# Patient Record
Sex: MALE | Race: WHITE | NOT HISPANIC OR LATINO | Employment: OTHER | URBAN - METROPOLITAN AREA
[De-identification: names, ages, dates, MRNs, and addresses within clinical notes are randomized per-mention and may not be internally consistent; named-entity substitution may affect disease eponyms.]

---

## 2024-04-23 ENCOUNTER — HOSPITAL ENCOUNTER (INPATIENT)
Facility: HOSPITAL | Age: 44
LOS: 1 days | Discharge: HOME | DRG: 357 | End: 2024-04-24
Attending: EMERGENCY MEDICINE | Admitting: SURGERY
Payer: COMMERCIAL

## 2024-04-23 ENCOUNTER — ANESTHESIA EVENT (INPATIENT)
Dept: PERIOP | Facility: HOSPITAL | Age: 44
DRG: 357 | End: 2024-04-23
Payer: COMMERCIAL

## 2024-04-23 ENCOUNTER — ANESTHESIA (INPATIENT)
Dept: PERIOP | Facility: HOSPITAL | Age: 44
DRG: 357 | End: 2024-04-23
Payer: COMMERCIAL

## 2024-04-23 ENCOUNTER — APPOINTMENT (EMERGENCY)
Dept: CT IMAGING | Facility: HOSPITAL | Age: 44
DRG: 357 | End: 2024-04-23
Payer: COMMERCIAL

## 2024-04-23 DIAGNOSIS — Z98.890 S/P LAPAROSCOPY: ICD-10-CM

## 2024-04-23 DIAGNOSIS — K56.609 SBO (SMALL BOWEL OBSTRUCTION) (HCC): Primary | ICD-10-CM

## 2024-04-23 PROBLEM — F17.200 SMOKING: Status: ACTIVE | Noted: 2024-04-23

## 2024-04-23 PROBLEM — F41.9 ANXIETY: Status: ACTIVE | Noted: 2024-04-23

## 2024-04-23 PROBLEM — F19.10 DRUG ABUSE (HCC): Status: ACTIVE | Noted: 2024-04-23

## 2024-04-23 PROBLEM — F10.10 ALCOHOL ABUSE: Status: ACTIVE | Noted: 2024-04-23

## 2024-04-23 LAB
ALBUMIN SERPL BCP-MCNC: 4.2 G/DL (ref 3.5–5)
ALP SERPL-CCNC: 75 U/L (ref 34–104)
ALT SERPL W P-5'-P-CCNC: 15 U/L (ref 7–52)
ANION GAP SERPL CALCULATED.3IONS-SCNC: 6 MMOL/L (ref 4–13)
AST SERPL W P-5'-P-CCNC: 15 U/L (ref 13–39)
BASOPHILS # BLD AUTO: 0.05 THOUSANDS/ÂΜL (ref 0–0.1)
BASOPHILS NFR BLD AUTO: 0 % (ref 0–1)
BILIRUB SERPL-MCNC: 0.84 MG/DL (ref 0.2–1)
BUN SERPL-MCNC: 10 MG/DL (ref 5–25)
CALCIUM SERPL-MCNC: 9 MG/DL (ref 8.4–10.2)
CHLORIDE SERPL-SCNC: 103 MMOL/L (ref 96–108)
CO2 SERPL-SCNC: 26 MMOL/L (ref 21–32)
CREAT SERPL-MCNC: 0.8 MG/DL (ref 0.6–1.3)
EOSINOPHIL # BLD AUTO: 0.29 THOUSAND/ÂΜL (ref 0–0.61)
EOSINOPHIL NFR BLD AUTO: 2 % (ref 0–6)
ERYTHROCYTE [DISTWIDTH] IN BLOOD BY AUTOMATED COUNT: 12.4 % (ref 11.6–15.1)
GFR SERPL CREATININE-BSD FRML MDRD: 108 ML/MIN/1.73SQ M
GLUCOSE SERPL-MCNC: 132 MG/DL (ref 65–140)
HCT VFR BLD AUTO: 47.4 % (ref 36.5–49.3)
HGB BLD-MCNC: 15.6 G/DL (ref 12–17)
IMM GRANULOCYTES # BLD AUTO: 0.11 THOUSAND/UL (ref 0–0.2)
IMM GRANULOCYTES NFR BLD AUTO: 1 % (ref 0–2)
LIPASE SERPL-CCNC: 219 U/L (ref 11–82)
LYMPHOCYTES # BLD AUTO: 1.56 THOUSANDS/ÂΜL (ref 0.6–4.47)
LYMPHOCYTES NFR BLD AUTO: 10 % (ref 14–44)
MCH RBC QN AUTO: 29.5 PG (ref 26.8–34.3)
MCHC RBC AUTO-ENTMCNC: 32.9 G/DL (ref 31.4–37.4)
MCV RBC AUTO: 90 FL (ref 82–98)
MONOCYTES # BLD AUTO: 1.29 THOUSAND/ÂΜL (ref 0.17–1.22)
MONOCYTES NFR BLD AUTO: 8 % (ref 4–12)
NEUTROPHILS # BLD AUTO: 12.09 THOUSANDS/ÂΜL (ref 1.85–7.62)
NEUTS SEG NFR BLD AUTO: 79 % (ref 43–75)
NRBC BLD AUTO-RTO: 0 /100 WBCS
PLATELET # BLD AUTO: 285 THOUSANDS/UL (ref 149–390)
PMV BLD AUTO: 9.4 FL (ref 8.9–12.7)
POTASSIUM SERPL-SCNC: 4.4 MMOL/L (ref 3.5–5.3)
PROT SERPL-MCNC: 6.5 G/DL (ref 6.4–8.4)
RBC # BLD AUTO: 5.29 MILLION/UL (ref 3.88–5.62)
SODIUM SERPL-SCNC: 135 MMOL/L (ref 135–147)
WBC # BLD AUTO: 15.39 THOUSAND/UL (ref 4.31–10.16)

## 2024-04-23 PROCEDURE — 74177 CT ABD & PELVIS W/CONTRAST: CPT

## 2024-04-23 PROCEDURE — 96361 HYDRATE IV INFUSION ADD-ON: CPT

## 2024-04-23 PROCEDURE — 0WJG4ZZ INSPECTION OF PERITONEAL CAVITY, PERCUTANEOUS ENDOSCOPIC APPROACH: ICD-10-PCS | Performed by: SURGERY

## 2024-04-23 PROCEDURE — 36415 COLL VENOUS BLD VENIPUNCTURE: CPT

## 2024-04-23 PROCEDURE — 99223 1ST HOSP IP/OBS HIGH 75: CPT | Performed by: SURGERY

## 2024-04-23 PROCEDURE — 85025 COMPLETE CBC W/AUTO DIFF WBC: CPT

## 2024-04-23 PROCEDURE — 96375 TX/PRO/DX INJ NEW DRUG ADDON: CPT

## 2024-04-23 PROCEDURE — 80053 COMPREHEN METABOLIC PANEL: CPT

## 2024-04-23 PROCEDURE — 96374 THER/PROPH/DIAG INJ IV PUSH: CPT

## 2024-04-23 PROCEDURE — C9113 INJ PANTOPRAZOLE SODIUM, VIA: HCPCS

## 2024-04-23 PROCEDURE — 99285 EMERGENCY DEPT VISIT HI MDM: CPT

## 2024-04-23 PROCEDURE — 99284 EMERGENCY DEPT VISIT MOD MDM: CPT

## 2024-04-23 PROCEDURE — 49320 DIAG LAPARO SEPARATE PROC: CPT | Performed by: SURGERY

## 2024-04-23 PROCEDURE — 83690 ASSAY OF LIPASE: CPT

## 2024-04-23 PROCEDURE — 49320 DIAG LAPARO SEPARATE PROC: CPT

## 2024-04-23 RX ORDER — SUCCINYLCHOLINE/SOD CL,ISO/PF 100 MG/5ML
SYRINGE (ML) INTRAVENOUS AS NEEDED
Status: DISCONTINUED | OUTPATIENT
Start: 2024-04-23 | End: 2024-04-23

## 2024-04-23 RX ORDER — ONDANSETRON 2 MG/ML
4 INJECTION INTRAMUSCULAR; INTRAVENOUS ONCE
Status: COMPLETED | OUTPATIENT
Start: 2024-04-23 | End: 2024-04-23

## 2024-04-23 RX ORDER — HYDROMORPHONE HCL/PF 1 MG/ML
SYRINGE (ML) INJECTION AS NEEDED
Status: DISCONTINUED | OUTPATIENT
Start: 2024-04-23 | End: 2024-04-23

## 2024-04-23 RX ORDER — HEPARIN SODIUM 5000 [USP'U]/ML
5000 INJECTION, SOLUTION INTRAVENOUS; SUBCUTANEOUS EVERY 8 HOURS SCHEDULED
Status: DISCONTINUED | OUTPATIENT
Start: 2024-04-24 | End: 2024-04-24 | Stop reason: HOSPADM

## 2024-04-23 RX ORDER — MIDAZOLAM HYDROCHLORIDE 2 MG/2ML
1 INJECTION, SOLUTION INTRAMUSCULAR; INTRAVENOUS ONCE
Status: COMPLETED | OUTPATIENT
Start: 2024-04-23 | End: 2024-04-23

## 2024-04-23 RX ORDER — ONDANSETRON 2 MG/ML
INJECTION INTRAMUSCULAR; INTRAVENOUS AS NEEDED
Status: DISCONTINUED | OUTPATIENT
Start: 2024-04-23 | End: 2024-04-23

## 2024-04-23 RX ORDER — MIDAZOLAM HYDROCHLORIDE 2 MG/2ML
INJECTION, SOLUTION INTRAMUSCULAR; INTRAVENOUS AS NEEDED
Status: DISCONTINUED | OUTPATIENT
Start: 2024-04-23 | End: 2024-04-23

## 2024-04-23 RX ORDER — SODIUM CHLORIDE, SODIUM LACTATE, POTASSIUM CHLORIDE, CALCIUM CHLORIDE 600; 310; 30; 20 MG/100ML; MG/100ML; MG/100ML; MG/100ML
125 INJECTION, SOLUTION INTRAVENOUS CONTINUOUS
Status: DISCONTINUED | OUTPATIENT
Start: 2024-04-23 | End: 2024-04-24

## 2024-04-23 RX ORDER — KETOROLAC TROMETHAMINE 30 MG/ML
INJECTION, SOLUTION INTRAMUSCULAR; INTRAVENOUS AS NEEDED
Status: DISCONTINUED | OUTPATIENT
Start: 2024-04-23 | End: 2024-04-23

## 2024-04-23 RX ORDER — HYDROMORPHONE HCL/PF 1 MG/ML
0.5 SYRINGE (ML) INJECTION EVERY 6 HOURS PRN
Status: DISCONTINUED | OUTPATIENT
Start: 2024-04-23 | End: 2024-04-24

## 2024-04-23 RX ORDER — PROPOFOL 10 MG/ML
INJECTION, EMULSION INTRAVENOUS AS NEEDED
Status: DISCONTINUED | OUTPATIENT
Start: 2024-04-23 | End: 2024-04-23

## 2024-04-23 RX ORDER — LIDOCAINE HYDROCHLORIDE 20 MG/ML
1 JELLY TOPICAL ONCE
Status: COMPLETED | OUTPATIENT
Start: 2024-04-23 | End: 2024-04-23

## 2024-04-23 RX ORDER — LIDOCAINE HCL/PF 100 MG/5ML
SYRINGE (ML) INJECTION AS NEEDED
Status: DISCONTINUED | OUTPATIENT
Start: 2024-04-23 | End: 2024-04-23

## 2024-04-23 RX ORDER — ENOXAPARIN SODIUM 100 MG/ML
40 INJECTION SUBCUTANEOUS DAILY
Status: DISCONTINUED | OUTPATIENT
Start: 2024-04-24 | End: 2024-04-23

## 2024-04-23 RX ORDER — CEFAZOLIN SODIUM 2 G/50ML
2000 SOLUTION INTRAVENOUS ONCE
Status: COMPLETED | OUTPATIENT
Start: 2024-04-23 | End: 2024-04-23

## 2024-04-23 RX ORDER — BUPIVACAINE HYDROCHLORIDE AND EPINEPHRINE 2.5; 5 MG/ML; UG/ML
INJECTION, SOLUTION EPIDURAL; INFILTRATION; INTRACAUDAL; PERINEURAL AS NEEDED
Status: DISCONTINUED | OUTPATIENT
Start: 2024-04-23 | End: 2024-04-23 | Stop reason: HOSPADM

## 2024-04-23 RX ORDER — CEFAZOLIN SODIUM 2 G/50ML
SOLUTION INTRAVENOUS AS NEEDED
Status: DISCONTINUED | OUTPATIENT
Start: 2024-04-23 | End: 2024-04-23

## 2024-04-23 RX ORDER — PANTOPRAZOLE SODIUM 40 MG/10ML
40 INJECTION, POWDER, LYOPHILIZED, FOR SOLUTION INTRAVENOUS EVERY 12 HOURS SCHEDULED
Status: DISCONTINUED | OUTPATIENT
Start: 2024-04-23 | End: 2024-04-24 | Stop reason: HOSPADM

## 2024-04-23 RX ORDER — ROCURONIUM BROMIDE 10 MG/ML
INJECTION, SOLUTION INTRAVENOUS AS NEEDED
Status: DISCONTINUED | OUTPATIENT
Start: 2024-04-23 | End: 2024-04-23

## 2024-04-23 RX ORDER — LANOLIN ALCOHOL/MO/W.PET/CERES
3 CREAM (GRAM) TOPICAL
Status: DISCONTINUED | OUTPATIENT
Start: 2024-04-23 | End: 2024-04-24 | Stop reason: HOSPADM

## 2024-04-23 RX ORDER — FENTANYL CITRATE 50 UG/ML
INJECTION, SOLUTION INTRAMUSCULAR; INTRAVENOUS AS NEEDED
Status: DISCONTINUED | OUTPATIENT
Start: 2024-04-23 | End: 2024-04-23

## 2024-04-23 RX ORDER — DEXAMETHASONE SODIUM PHOSPHATE 10 MG/ML
INJECTION, SOLUTION INTRAMUSCULAR; INTRAVENOUS AS NEEDED
Status: DISCONTINUED | OUTPATIENT
Start: 2024-04-23 | End: 2024-04-23

## 2024-04-23 RX ORDER — HYDROMORPHONE HCL/PF 1 MG/ML
0.5 SYRINGE (ML) INJECTION EVERY 6 HOURS PRN
Status: CANCELLED | OUTPATIENT
Start: 2024-04-23

## 2024-04-23 RX ORDER — HEPARIN SODIUM 5000 [USP'U]/ML
5000 INJECTION, SOLUTION INTRAVENOUS; SUBCUTANEOUS EVERY 8 HOURS SCHEDULED
Status: CANCELLED | OUTPATIENT
Start: 2024-04-23

## 2024-04-23 RX ORDER — METRONIDAZOLE 500 MG/100ML
500 INJECTION, SOLUTION INTRAVENOUS ONCE
Status: COMPLETED | OUTPATIENT
Start: 2024-04-23 | End: 2024-04-23

## 2024-04-23 RX ORDER — ACETAMINOPHEN 10 MG/ML
1000 INJECTION, SOLUTION INTRAVENOUS EVERY 6 HOURS PRN
Status: DISCONTINUED | OUTPATIENT
Start: 2024-04-23 | End: 2024-04-23

## 2024-04-23 RX ORDER — KETOROLAC TROMETHAMINE 30 MG/ML
15 INJECTION, SOLUTION INTRAMUSCULAR; INTRAVENOUS ONCE
Status: COMPLETED | OUTPATIENT
Start: 2024-04-23 | End: 2024-04-23

## 2024-04-23 RX ORDER — SODIUM CHLORIDE 9 MG/ML
125 INJECTION, SOLUTION INTRAVENOUS CONTINUOUS
Status: CANCELLED | OUTPATIENT
Start: 2024-04-23

## 2024-04-23 RX ADMIN — SUGAMMADEX 200 MG: 100 INJECTION, SOLUTION INTRAVENOUS at 20:51

## 2024-04-23 RX ADMIN — Medication 3 MG: at 22:08

## 2024-04-23 RX ADMIN — IOHEXOL 100 ML: 350 INJECTION, SOLUTION INTRAVENOUS at 14:51

## 2024-04-23 RX ADMIN — LIDOCAINE HYDROCHLORIDE 1 APPLICATION: 20 JELLY TOPICAL at 18:07

## 2024-04-23 RX ADMIN — MIDAZOLAM 1 MG: 1 INJECTION INTRAMUSCULAR; INTRAVENOUS at 18:07

## 2024-04-23 RX ADMIN — PROPOFOL 300 MG: 10 INJECTION, EMULSION INTRAVENOUS at 19:51

## 2024-04-23 RX ADMIN — SODIUM CHLORIDE 1000 ML: 0.9 INJECTION, SOLUTION INTRAVENOUS at 13:59

## 2024-04-23 RX ADMIN — CEFAZOLIN SODIUM 2000 MG: 2 SOLUTION INTRAVENOUS at 18:48

## 2024-04-23 RX ADMIN — ONDANSETRON 4 MG: 2 INJECTION INTRAMUSCULAR; INTRAVENOUS at 13:58

## 2024-04-23 RX ADMIN — KETOROLAC TROMETHAMINE 30 MG: 30 INJECTION, SOLUTION INTRAMUSCULAR; INTRAVENOUS at 20:43

## 2024-04-23 RX ADMIN — ONDANSETRON 4 MG: 2 INJECTION INTRAMUSCULAR; INTRAVENOUS at 20:42

## 2024-04-23 RX ADMIN — DEXMEDETOMIDINE 8 MCG: 100 INJECTION, SOLUTION, CONCENTRATE INTRAVENOUS at 19:40

## 2024-04-23 RX ADMIN — HYDROMORPHONE HYDROCHLORIDE 1 MG: 1 INJECTION, SOLUTION INTRAMUSCULAR; INTRAVENOUS; SUBCUTANEOUS at 20:38

## 2024-04-23 RX ADMIN — PANTOPRAZOLE SODIUM 40 MG: 40 INJECTION, POWDER, FOR SOLUTION INTRAVENOUS at 22:08

## 2024-04-23 RX ADMIN — ROCURONIUM BROMIDE 50 MG: 10 INJECTION, SOLUTION INTRAVENOUS at 19:59

## 2024-04-23 RX ADMIN — MIDAZOLAM 2 MG: 1 INJECTION INTRAMUSCULAR; INTRAVENOUS at 19:40

## 2024-04-23 RX ADMIN — FENTANYL CITRATE 50 MCG: 50 INJECTION, SOLUTION INTRAMUSCULAR; INTRAVENOUS at 19:51

## 2024-04-23 RX ADMIN — DEXAMETHASONE SODIUM PHOSPHATE 10 MG: 10 INJECTION, SOLUTION INTRAMUSCULAR; INTRAVENOUS at 20:43

## 2024-04-23 RX ADMIN — Medication 100 MG: at 19:52

## 2024-04-23 RX ADMIN — SODIUM CHLORIDE, SODIUM LACTATE, POTASSIUM CHLORIDE, AND CALCIUM CHLORIDE: .6; .31; .03; .02 INJECTION, SOLUTION INTRAVENOUS at 20:45

## 2024-04-23 RX ADMIN — KETOROLAC TROMETHAMINE 15 MG: 30 INJECTION, SOLUTION INTRAMUSCULAR; INTRAVENOUS at 13:58

## 2024-04-23 RX ADMIN — CEFAZOLIN SODIUM 2000 MG: 2 SOLUTION INTRAVENOUS at 19:55

## 2024-04-23 RX ADMIN — METRONIDAZOLE: 5 INJECTION, SOLUTION INTRAVENOUS at 19:58

## 2024-04-23 RX ADMIN — TOPICAL ANESTHETIC 2 SPRAY: 200 SPRAY DENTAL; PERIODONTAL at 18:07

## 2024-04-23 RX ADMIN — DEXMEDETOMIDINE 8 MCG: 100 INJECTION, SOLUTION, CONCENTRATE INTRAVENOUS at 19:44

## 2024-04-23 RX ADMIN — SODIUM CHLORIDE, SODIUM LACTATE, POTASSIUM CHLORIDE, AND CALCIUM CHLORIDE: .6; .31; .03; .02 INJECTION, SOLUTION INTRAVENOUS at 19:43

## 2024-04-23 RX ADMIN — FENTANYL CITRATE 50 MCG: 50 INJECTION, SOLUTION INTRAMUSCULAR; INTRAVENOUS at 20:04

## 2024-04-23 RX ADMIN — LIDOCAINE HYDROCHLORIDE 100 MG: 20 INJECTION INTRAVENOUS at 19:51

## 2024-04-23 RX ADMIN — DEXMEDETOMIDINE 4 MCG: 100 INJECTION, SOLUTION, CONCENTRATE INTRAVENOUS at 19:47

## 2024-04-23 NOTE — H&P
"H&P Exam - General Surgery   Cj De La Cruz 43 y.o. male MRN: [id number]  Unit/Bed#: ED 04 Encounter: [id number]    Assessment/Plan     Assessment:    Presents with abdominal pain x this morning. N/V + diarrhea x 2 days.    No significant past medical history, no surgical history.   Abdominal exam with distension, TTP epigastric region. Tympanic. No guarding, rebound, or peritoneal signs. Normal bowel sounds.   Afebrile, VSS   CT A/P   Multiple dilated proximal and mid small bowel loops measuring up to 4.4 cm in diameter with \"small bowel feces sign\", and collapsed distal ileal loops, suggestive of low-grade small bowel obstruction.   Exact transition point is not clearly identified but appears to be in the right lower quadrant. Moderate gastric distention.  WBC 15.39   Lipase 219     Plan:  Admit to surgical service, plan for diagnostic laparoscopy this evening given history of virgin abdomen  IVF, IV abx  NPO, NGT   Surgical consent obtained   Case request added   Procedure discussed in detail with patient and family, risk vs. benefits of procedure discussed.   Trend vitals, labs, abdominal exam   KUB for morning   Pain control  DVT, VTE prophylaxis     Incidental finding on CT   Indeterminate 10 mm right renal lesion, for which further characterization with nonemergent renal ultrasound is recommended.   Discussed with patient, recommended outpatient follow up with PCP for further w/u       History of Present Illness     HPI:  Cj De La Cruz is a 43 y.o. male with no significant past medical history, no significant surgical history presenting to ED with concerns of abdominal pain x this morning and N/V + diarrhea x 2 days. Patient states he ate left over sushi from Saturday on Sunday and began with N/V + diarrhea Sunday evening into Monday. Initially thought to be related to gastroenteritis from eating the left over sushi. Denies any complaints of N/V or diarrhea today. Denies any fevers, or chills. Presented with " "abdominal pain beginning this AM, describes pain and dull and achy sensation centered around his umbilicus and epigastric region. Patient admits he was able to eat some special K fiber cereal this AM which be thought to trigger his abdominal pain. Patient denies any recent travel history, sick contacts, or hx of viral illnesses. Patient further admits he has not been to doctor for the past 15 years. Admits to drinking \"here and there\", having approximately 6-7 alcoholic drinks per week. Per patient's wife family has PMHx of drug and alcohol abuse, previous use with cocaine and meth. Denies any headache, SOB, chest pain, back pain, or other changes in his bladder or bowel habits not previously mentioned above. Denies further questions or complaints.     Review of Systems   Constitutional:  Negative for chills and fever.   Respiratory:  Negative for cough.    Gastrointestinal:  Positive for abdominal distention and abdominal pain. Negative for nausea and vomiting.   Genitourinary:  Negative for difficulty urinating.   Musculoskeletal:  Negative for arthralgias.       Historical Information   Past Medical History:   Diagnosis Date    Alcohol abuse     Anxiety     Drug abuse (HCC)     pt reports abusing alcohol and occassionally meth and cocaine     History reviewed. No pertinent surgical history.  Social History   Social History     Substance and Sexual Activity   Alcohol Use Yes    Alcohol/week: 12.0 - 18.0 standard drinks of alcohol    Types: 12 - 18 Cans of beer per week    Comment: says he was sober for 6 months, then 4-6 weeks ago began drinking 12-18 beers once or twice on weekend nights     Social History     Substance and Sexual Activity   Drug Use Yes    Types: Cocaine, Methamphetamines    Comment: pt reports occasional use of meth and cocaine     Social History     Tobacco Use   Smoking Status Former   Smokeless Tobacco Not on file     E-Cigarette/Vaping     E-Cigarette/Vaping Substances     Family History: " non-contributory    Meds/Allergies   all medications and allergies reviewed  No Known Allergies    Objective   First Vitals:   Blood Pressure: 137/87 (04/23/24 1318)  Pulse: 57 (04/23/24 1318)  Temperature: 97.5 °F (36.4 °C) (04/23/24 1318)  Temp Source: Oral (04/23/24 1318)  Respirations: 18 (04/23/24 1318)  SpO2: 100 % (04/23/24 1318)    Current Vitals:   Blood Pressure: 138/78 (04/23/24 1600)  Pulse: (!) 50 (04/23/24 1600)  Temperature: 97.5 °F (36.4 °C) (04/23/24 1318)  Temp Source: Oral (04/23/24 1318)  Respirations: 13 (04/23/24 1510)  SpO2: 99 % (04/23/24 1600)    No intake or output data in the 24 hours ending 04/23/24 1620    Invasive Devices       Peripheral Intravenous Line  Duration             Peripheral IV 04/23/24 Right Antecubital <1 day                    Physical Exam  Vitals and nursing note reviewed.   HENT:      Head: Normocephalic.      Right Ear: External ear normal.      Left Ear: External ear normal.      Nose: Nose normal.   Eyes:      Pupils: Pupils are equal, round, and reactive to light.   Cardiovascular:      Rate and Rhythm: Normal rate and regular rhythm.      Pulses: Normal pulses.      Heart sounds: Normal heart sounds.   Pulmonary:      Effort: Pulmonary effort is normal. No respiratory distress.      Breath sounds: Normal breath sounds.   Abdominal:      General: Abdomen is flat. Bowel sounds are normal. There is distension.      Palpations: Abdomen is soft. There is no mass.      Tenderness: There is abdominal tenderness in the epigastric area and periumbilical area. There is no guarding or rebound. Negative signs include Mckeon's sign and McBurney's sign.      Hernia: No hernia is present.   Musculoskeletal:         General: Normal range of motion.   Skin:     General: Skin is warm.   Neurological:      Mental Status: He is alert. Mental status is at baseline.   Psychiatric:         Mood and Affect: Mood normal.         Behavior: Behavior normal.         Thought Content: Thought  content normal.         Judgment: Judgment normal.         Lab Results: I have personally reviewed pertinent lab results.  , CBC:   Lab Results   Component Value Date    WBC 15.39 (H) 04/23/2024    HGB 15.6 04/23/2024    HCT 47.4 04/23/2024    MCV 90 04/23/2024     04/23/2024    RBC 5.29 04/23/2024    MCH 29.5 04/23/2024    MCHC 32.9 04/23/2024    RDW 12.4 04/23/2024    MPV 9.4 04/23/2024    NRBC 0 04/23/2024   , CMP:   Lab Results   Component Value Date    SODIUM 135 04/23/2024    K 4.4 04/23/2024     04/23/2024    CO2 26 04/23/2024    BUN 10 04/23/2024    CREATININE 0.80 04/23/2024    CALCIUM 9.0 04/23/2024    AST 15 04/23/2024    ALT 15 04/23/2024    ALKPHOS 75 04/23/2024    EGFR 108 04/23/2024     Imaging: I have personally reviewed pertinent reports.    EKG, Pathology, and Other Studies: I have personally reviewed pertinent reports.      Code Status: No Order  Advance Directive and Living Will:      Power of :    POLST:      Counseling / Coordination of Care  Total floor / unit time spent today 40 minutes.  Greater than 50% of total time was spent with the patient and / or family counseling and / or coordination of care.  A description of the counseling / coordination of care: 40..

## 2024-04-23 NOTE — ANESTHESIA PREPROCEDURE EVALUATION
Procedure:  LAPAROSCOPY DIAGNOSTIC (Abdomen)    Relevant Problems   ANESTHESIA (within normal limits)  Only prior for wisdom teeth extraction; no issues      CARDIO (within normal limits)      GI/HEPATIC  SBO   NGT placed in ED  Nausea improved  Ate breakfast 0600, drank small amount around noon, few ounces at 1800 with NGT placement      NEURO/PSYCH   (+) Anxiety      PULMONARY   (+) Smoking (Occasional, + smokeless tobacco use)   (-) URI (upper respiratory infection)      Behavioral Health   (+) Alcohol abuse (H/o)   (+) Drug abuse (HCC) (H/o cocaine, meth use)      Physical Exam    Airway    Mallampati score: II  TM Distance: >3 FB  Neck ROM: full     Dental   No notable dental hx     Cardiovascular      Pulmonary      Other Findings      Lab Results   Component Value Date    WBC 15.39 (H) 04/23/2024    HGB 15.6 04/23/2024     04/23/2024     Lab Results   Component Value Date    SODIUM 135 04/23/2024    K 4.4 04/23/2024    BUN 10 04/23/2024    CREATININE 0.80 04/23/2024    EGFR 108 04/23/2024     CT A/P IMPRESSION:     1. Findings suggestive of small bowel obstruction with transition point probably in the right lower quadrant.     2. Mild mesenteric edema with trace interloop free fluid.     3. Incidentally noted indeterminate 10 mm right renal lesion, for which further characterization with nonemergent renal ultrasound is recommended.    Anesthesia Plan  ASA Score- 2 Emergent    Anesthesia Type- general with ASA Monitors.         Additional Monitors:     Airway Plan: ETT.           Plan Factors-Exercise tolerance (METS): >4 METS.    Chart reviewed.   Existing labs reviewed. Patient summary reviewed.    Patient is a current smoker.  Patient did not smoke on day of surgery.            Induction- intravenous and rapid sequence induction.    Postoperative Plan-     Informed Consent- Anesthetic plan and risks discussed with patient.  I personally reviewed this patient with the CRNA. Discussed and agreed on the  Anesthesia Plan with the CRNA..

## 2024-04-23 NOTE — ED PROVIDER NOTES
History  Chief Complaint   Patient presents with    Abdominal Pain     Patient presents to the ED with c/o vomiting and diarrhea with abdominal pain, states went to urgent care and had an xray and was sent for possible bowel blockage.      The patient is a 43-year-old male with PMH of alcohol abuse, occasional methamphetamine and cocaine use, and anxiety presenting for evaluation of 1 day of abdominal pain and N/V/D.  The patient started yesterday evening with periumbilical and generalized abdominal discomfort described as bloating and gassiness.  He notes soon after developing nausea, vomiting, diarrhea.  He vomited approximately 6-8 times which initially was undigested food and towards the end was more liquids and yellow in appearance.  He notes having looser than normal bowel movements (loose brown, no blood or melena).  He notes throughout the night having ongoing abdominal pain.  He went to urgent care prior to arrival and was instructed to come here for further evaluation and concern of SBO given abnormal abdominal x-ray.  He denies associated fevers but endorses chills.  He denies lightheadedness/dizziness, CP/SOB, flank pain, urinary complaints, and skin changes.  He notes drinking EtOH recreationally and none recently.  He does report eating sushi on Saturday and Sunday but no other uncooked fish or undercooked meats since that time.  He denies sick contacts.      History provided by:  Patient and significant other   used: No        Prior to Admission Medications   Prescriptions Last Dose Informant Patient Reported? Taking?   Calcium Carbonate Antacid (SHEYLA-SELTZER ANTACID PO)   Yes No   Sig: Take 1 tablet by mouth daily.      Facility-Administered Medications: None       Past Medical History:   Diagnosis Date    Alcohol abuse     Anxiety     Drug abuse (HCC)     pt reports abusing alcohol and occassionally meth and cocaine       History reviewed. No pertinent surgical  history.    History reviewed. No pertinent family history.  I have reviewed and agree with the history as documented.    E-Cigarette/Vaping     E-Cigarette/Vaping Substances     Social History     Tobacco Use    Smoking status: Former   Substance Use Topics    Alcohol use: Yes     Alcohol/week: 12.0 - 18.0 standard drinks of alcohol     Types: 12 - 18 Cans of beer per week     Comment: says he was sober for 6 months, then 4-6 weeks ago began drinking 12-18 beers once or twice on weekend nights    Drug use: Yes     Types: Cocaine, Methamphetamines     Comment: pt reports occasional use of meth and cocaine       Review of Systems   Constitutional:  Positive for chills and fatigue. Negative for fever.   Respiratory:  Negative for cough and shortness of breath.    Cardiovascular:  Negative for chest pain.   Gastrointestinal:  Positive for abdominal pain, diarrhea, nausea and vomiting.   Genitourinary: Negative.    Musculoskeletal:  Negative for back pain and gait problem.   Skin:  Negative for color change, pallor, rash and wound.   Neurological:  Negative for dizziness, syncope, weakness, light-headedness and headaches.   All other systems reviewed and are negative.      Physical Exam  Physical Exam  Vitals and nursing note reviewed.   Constitutional:       General: He is in acute distress (Evidencing mild discomfort and mild distress).      Appearance: Normal appearance. He is well-developed. He is ill-appearing (Appears fatigued and mildly ill). He is not toxic-appearing or diaphoretic.   HENT:      Head: Normocephalic and atraumatic.      Jaw: There is normal jaw occlusion.      Nose: Nose normal.      Mouth/Throat:      Lips: Pink.      Mouth: Mucous membranes are moist.   Eyes:      Extraocular Movements: Extraocular movements intact.      Conjunctiva/sclera: Conjunctivae normal.   Cardiovascular:      Rate and Rhythm: Normal rate and regular rhythm.      Pulses: Normal pulses.           Radial pulses are 2+ on  the right side and 2+ on the left side.        Dorsalis pedis pulses are 2+ on the right side and 2+ on the left side.      Heart sounds: Normal heart sounds, S1 normal and S2 normal. No murmur heard.  Pulmonary:      Effort: Pulmonary effort is normal. No tachypnea or respiratory distress.      Breath sounds: Normal breath sounds and air entry. No stridor, decreased air movement or transmitted upper airway sounds. No decreased breath sounds.   Abdominal:      Palpations: Abdomen is soft.      Tenderness: There is generalized abdominal tenderness and tenderness in the periumbilical area. There is no guarding or rebound. Negative signs include Mckeon's sign.   Musculoskeletal:         General: Normal range of motion.      Cervical back: Neck supple.      Right lower leg: No edema.      Left lower leg: No edema.   Skin:     General: Skin is warm and dry.      Capillary Refill: Capillary refill takes 2 to 3 seconds.      Findings: No rash or wound.   Neurological:      General: No focal deficit present.      Mental Status: He is alert and oriented to person, place, and time. Mental status is at baseline.         Vital Signs  ED Triage Vitals   Temperature Pulse Respirations Blood Pressure SpO2   04/23/24 1318 04/23/24 1318 04/23/24 1318 04/23/24 1318 04/23/24 1318   97.5 °F (36.4 °C) 57 18 137/87 100 %      Temp Source Heart Rate Source Patient Position - Orthostatic VS BP Location FiO2 (%)   04/23/24 1318 04/23/24 1318 04/23/24 1318 04/23/24 1318 --   Oral Monitor Lying Right arm       Pain Score       04/23/24 1315       5           Vitals:    04/23/24 1415 04/23/24 1510 04/23/24 1600 04/23/24 1800   BP: 136/87 135/75 138/78 137/80   Pulse: (!) 54 73 (!) 50 57   Patient Position - Orthostatic VS:             Visual Acuity      ED Medications  Medications   midazolam (VERSED) injection 1 mg (has no administration in time range)   lidocaine (URO-JET) 2 % urethral/mucosal gel 1 Application (has no administration in time  range)   benzocaine (HURRICAINE) 20 % mucosal spray 2 spray (has no administration in time range)   sodium chloride 0.9 % bolus 1,000 mL (1,000 mL Intravenous New Bag 4/23/24 1359)   ondansetron (ZOFRAN) injection 4 mg (4 mg Intravenous Given 4/23/24 1358)   ketorolac (TORADOL) injection 15 mg (15 mg Intravenous Given 4/23/24 1358)   iohexol (OMNIPAQUE) 350 MG/ML injection (SINGLE-DOSE) 100 mL (100 mL Intravenous Given 4/23/24 1451)       Diagnostic Studies  Results Reviewed       Procedure Component Value Units Date/Time    Comprehensive metabolic panel [[id number]] Collected: 04/23/24 1357    Lab Status: Final result Specimen: Blood from Arm, Right Updated: 04/23/24 1427     Sodium 135 mmol/L      Potassium 4.4 mmol/L      Chloride 103 mmol/L      CO2 26 mmol/L      ANION GAP 6 mmol/L      BUN 10 mg/dL      Creatinine 0.80 mg/dL      Glucose 132 mg/dL      Calcium 9.0 mg/dL      AST 15 U/L      ALT 15 U/L      Alkaline Phosphatase 75 U/L      Total Protein 6.5 g/dL      Albumin 4.2 g/dL      Total Bilirubin 0.84 mg/dL      eGFR 108 ml/min/1.73sq m     Narrative:      National Kid[employer] Foundation guidelines for Chronic Kidney Disease (CKD):     Stage 1 with normal or high GFR (GFR > 90 mL/min/1.73 square meters)    Stage 2 Mild CKD (GFR = 60-89 mL/min/1.73 square meters)    Stage 3A Moderate CKD (GFR = 45-59 mL/min/1.73 square meters)    Stage 3B Moderate CKD (GFR = 30-44 mL/min/1.73 square meters)    Stage 4 Severe CKD (GFR = 15-29 mL/min/1.73 square meters)    Stage 5 End Stage CKD (GFR <15 mL/min/1.73 square meters)  Note: GFR calculation is accurate only with a steady state creatinine    Lipase [[id number]]  (Abnormal) Collected: 04/23/24 1357    Lab Status: Final result Specimen: Blood from Arm, Right Updated: 04/23/24 1427     Lipase 219 u/L     CBC and differential [[id number]]  (Abnormal) Collected: 04/23/24 1357    Lab Status: Final result Specimen: Blood from Arm, Right Updated: 04/23/24 1409     WBC  15.39 Thousand/uL      RBC 5.29 Million/uL      Hemoglobin 15.6 g/dL      Hematocrit 47.4 %      MCV 90 fL      MCH 29.5 pg      MCHC 32.9 g/dL      RDW 12.4 %      MPV 9.4 fL      Platelets 285 Thousands/uL      nRBC 0 /100 WBCs      Segmented % 79 %      Immature Grans % 1 %      Lymphocytes % 10 %      Monocytes % 8 %      Eosinophils Relative 2 %      Basophils Relative 0 %      Absolute Neutrophils 12.09 Thousands/µL      Absolute Immature Grans 0.11 Thousand/uL      Absolute Lymphocytes 1.56 Thousands/µL      Absolute Monocytes 1.29 Thousand/µL      Eosinophils Absolute 0.29 Thousand/µL      Basophils Absolute 0.05 Thousands/µL                    CT abdomen pelvis with contrast   Final Result by Nereida Minor MD (04/23 1547)      1. Findings suggestive of small bowel obstruction with transition point probably in the right lower quadrant.      2. Mild mesenteric edema with trace interloop free fluid.      3. Incidentally noted indeterminate 10 mm right renal lesion, for which further characterization with nonemergent renal ultrasound is recommended.      The study was marked in EPIC for immediate notification.         Resident: SERAFIN Snow I, the attending radiologist, have reviewed the images and agree with the final report above.      Workstation performed: [id number]                    Procedures  Procedures         ED Course  ED Course as of 04/23/24 1807   Tue Apr 23, 2024   1430 LIPASE(!): 219  Elevated, obtaining CTAP   1603 CT abdomen pelvis with contrast  IMPRESSION:     1. Findings suggestive of small bowel obstruction with transition point probably in the right lower quadrant.     2. Mild mesenteric edema with trace interloop free fluid.     3. Incidentally noted indeterminate 10 mm right renal lesion, for which further characterization with nonemergent renal ultrasound is recommended.     1604 General surgery KIRSTIE Chandana messaged regarding patient case   1605 On reevaluation, patient  with improved appearance.  He notes his pain and nausea has significantly improved.  I updated him on blood work as well as CT results.  He is aware that I have reached out to general surgery for consult given finding of SBO   1806 Per Dr. Schwartz from surgery, plan for ex laparotomy to further evaluate for mass as cause of SBO.  They request NG tube insertion prior to being brought to the OR.  Will write for one-time dose of Versed as well as Uro-Jet and HurriCaine spray.  Patient in agreement with trying to place NG tube.  He is aware he is going to the OR and was consented by KIRSTIE Tomas at bedside                               SBIRT 20yo+      Flowsheet Row Most Recent Value   Initial Alcohol Screen: US AUDIT-C     1. How often do you have a drink containing alcohol? 0 Filed at: 04/23/2024 1316   2. How many drinks containing alcohol do you have on a typical day you are drinking?  0 Filed at: 04/23/2024 1316   3a. Male UNDER 65: How often do you have five or more drinks on one occasion? 0 Filed at: 04/23/2024 1316   3b. FEMALE Any Age, or MALE 65+: How often do you have 4 or more drinks on one occassion? 0 Filed at: 04/23/2024 1316   Audit-C Score 0 Filed at: 04/23/2024 1316   HESHAM: How many times in the past year have you...    Used an illegal drug or used a prescription medication for non-medical reasons? Never Filed at: 04/23/2024 1316                      Medical Decision Making  DDx including but not limited to: Viral illness, gastroenteritis, colitis, diverticulitis, partial SBO, SBO, pancreatitis, hepatitis    Labs and CT imaging obtained.  Blood work with leukocytosis with leftward shift.  Lipase is elevated to 219.  On further questioning, the patient denies history of acute pancreatitis.  CT imaging with dilated loops of bowel with finding of small bowel obstruction.  Discussed with general surgery who will come see the patient and likely admit.  Patient had improved pain and nausea with Zofran and  Toradol.  Fluids hung given he is n.p.o. while awaiting surgery evaluation.    General surgery to admit and likely take to OR.    Problems Addressed:  SBO (small bowel obstruction) (HCC): acute illness or injury    Amount and/or Complexity of Data Reviewed  Labs: ordered. Decision-making details documented in ED Course.  Radiology: ordered. Decision-making details documented in ED Course.    Risk  OTC drugs.  Prescription drug management.  Decision regarding hospitalization.             Disposition  Final diagnoses:   SBO (small bowel obstruction) (HCC)     Time reflects when diagnosis was documented in both MDM as applicable and the Disposition within this note       Time User Action Codes Description Comment    4/23/2024  5:43 PM Whit Tomas Add [K56.609] SBO (small bowel obstruction) (HCC)     4/23/2024  6:06 PM Amanda Diaz Modify [K56.609] SBO (small bowel obstruction) (HCC)           ED Disposition       ED Disposition   Admit    Condition   Stable    Date/Time   Tue Apr 23, 2024 [id number]    Comment   Case was discussed with KIRSTIE Tomas and the patient's admission status was agreed to be Admission Status: inpatient status to the service of Dr. Schwartz .               Follow-up Information    None         Patient's Medications   Discharge Prescriptions    No medications on file       No discharge procedures on file.    PDMP Review       None            ED Provider  Electronically Signed by             TEE Pittman  04/23/24 0875

## 2024-04-24 ENCOUNTER — APPOINTMENT (INPATIENT)
Dept: RADIOLOGY | Facility: HOSPITAL | Age: 44
DRG: 357 | End: 2024-04-24
Payer: COMMERCIAL

## 2024-04-24 VITALS
OXYGEN SATURATION: 100 % | DIASTOLIC BLOOD PRESSURE: 73 MMHG | WEIGHT: 210.1 LBS | HEIGHT: 73 IN | HEART RATE: 66 BPM | SYSTOLIC BLOOD PRESSURE: 122 MMHG | TEMPERATURE: 97.9 F | RESPIRATION RATE: 18 BRPM | BODY MASS INDEX: 27.85 KG/M2

## 2024-04-24 LAB
ANION GAP SERPL CALCULATED.3IONS-SCNC: 5 MMOL/L (ref 4–13)
BASOPHILS # BLD AUTO: 0.03 THOUSANDS/ÂΜL (ref 0–0.1)
BASOPHILS NFR BLD AUTO: 0 % (ref 0–1)
BUN SERPL-MCNC: 8 MG/DL (ref 5–25)
CALCIUM SERPL-MCNC: 8.6 MG/DL (ref 8.4–10.2)
CHLORIDE SERPL-SCNC: 107 MMOL/L (ref 96–108)
CO2 SERPL-SCNC: 25 MMOL/L (ref 21–32)
CREAT SERPL-MCNC: 0.82 MG/DL (ref 0.6–1.3)
EOSINOPHIL # BLD AUTO: 0 THOUSAND/ÂΜL (ref 0–0.61)
EOSINOPHIL NFR BLD AUTO: 0 % (ref 0–6)
ERYTHROCYTE [DISTWIDTH] IN BLOOD BY AUTOMATED COUNT: 12.7 % (ref 11.6–15.1)
GFR SERPL CREATININE-BSD FRML MDRD: 107 ML/MIN/1.73SQ M
GLUCOSE SERPL-MCNC: 157 MG/DL (ref 65–140)
HCT VFR BLD AUTO: 45.5 % (ref 36.5–49.3)
HGB BLD-MCNC: 14.9 G/DL (ref 12–17)
IMM GRANULOCYTES # BLD AUTO: 0.08 THOUSAND/UL (ref 0–0.2)
IMM GRANULOCYTES NFR BLD AUTO: 1 % (ref 0–2)
LYMPHOCYTES # BLD AUTO: 0.83 THOUSANDS/ÂΜL (ref 0.6–4.47)
LYMPHOCYTES NFR BLD AUTO: 8 % (ref 14–44)
MAGNESIUM SERPL-MCNC: 2 MG/DL (ref 1.9–2.7)
MCH RBC QN AUTO: 29.6 PG (ref 26.8–34.3)
MCHC RBC AUTO-ENTMCNC: 32.7 G/DL (ref 31.4–37.4)
MCV RBC AUTO: 90 FL (ref 82–98)
MONOCYTES # BLD AUTO: 0.17 THOUSAND/ÂΜL (ref 0.17–1.22)
MONOCYTES NFR BLD AUTO: 2 % (ref 4–12)
NEUTROPHILS # BLD AUTO: 9.6 THOUSANDS/ÂΜL (ref 1.85–7.62)
NEUTS SEG NFR BLD AUTO: 89 % (ref 43–75)
NRBC BLD AUTO-RTO: 0 /100 WBCS
PHOSPHATE SERPL-MCNC: 2.4 MG/DL (ref 2.7–4.5)
PLATELET # BLD AUTO: 284 THOUSANDS/UL (ref 149–390)
PLATELET # BLD AUTO: 284 THOUSANDS/UL (ref 149–390)
PMV BLD AUTO: 10 FL (ref 8.9–12.7)
PMV BLD AUTO: 9.9 FL (ref 8.9–12.7)
POTASSIUM SERPL-SCNC: 4.3 MMOL/L (ref 3.5–5.3)
RBC # BLD AUTO: 5.04 MILLION/UL (ref 3.88–5.62)
SODIUM SERPL-SCNC: 137 MMOL/L (ref 135–147)
WBC # BLD AUTO: 10.71 THOUSAND/UL (ref 4.31–10.16)

## 2024-04-24 PROCEDURE — 74018 RADEX ABDOMEN 1 VIEW: CPT

## 2024-04-24 PROCEDURE — C9113 INJ PANTOPRAZOLE SODIUM, VIA: HCPCS

## 2024-04-24 PROCEDURE — 99024 POSTOP FOLLOW-UP VISIT: CPT | Performed by: PHYSICIAN ASSISTANT

## 2024-04-24 PROCEDURE — 83735 ASSAY OF MAGNESIUM: CPT | Performed by: SURGERY

## 2024-04-24 PROCEDURE — NC001 PR NO CHARGE: Performed by: SURGERY

## 2024-04-24 PROCEDURE — 84100 ASSAY OF PHOSPHORUS: CPT | Performed by: SURGERY

## 2024-04-24 PROCEDURE — 85049 AUTOMATED PLATELET COUNT: CPT | Performed by: STUDENT IN AN ORGANIZED HEALTH CARE EDUCATION/TRAINING PROGRAM

## 2024-04-24 PROCEDURE — 85025 COMPLETE CBC W/AUTO DIFF WBC: CPT | Performed by: SURGERY

## 2024-04-24 PROCEDURE — 80048 BASIC METABOLIC PNL TOTAL CA: CPT | Performed by: SURGERY

## 2024-04-24 RX ORDER — OXYCODONE HYDROCHLORIDE 5 MG/1
5 TABLET ORAL EVERY 4 HOURS PRN
Status: DISCONTINUED | OUTPATIENT
Start: 2024-04-24 | End: 2024-04-24 | Stop reason: HOSPADM

## 2024-04-24 RX ORDER — OXYCODONE HYDROCHLORIDE 10 MG/1
10 TABLET ORAL EVERY 4 HOURS PRN
Status: DISCONTINUED | OUTPATIENT
Start: 2024-04-24 | End: 2024-04-24 | Stop reason: HOSPADM

## 2024-04-24 RX ORDER — OXYCODONE HYDROCHLORIDE 5 MG/1
5 TABLET ORAL EVERY 6 HOURS PRN
Qty: 10 TABLET | Refills: 0 | Status: CANCELLED | OUTPATIENT
Start: 2024-04-24 | End: 2024-04-27

## 2024-04-24 RX ORDER — OXYCODONE HYDROCHLORIDE 5 MG/1
5 TABLET ORAL EVERY 6 HOURS PRN
Qty: 10 TABLET | Refills: 0 | Status: SHIPPED | OUTPATIENT
Start: 2024-04-24 | End: 2024-04-27

## 2024-04-24 RX ORDER — LORAZEPAM 2 MG/ML
0.5 INJECTION INTRAMUSCULAR ONCE
Status: COMPLETED | OUTPATIENT
Start: 2024-04-24 | End: 2024-04-24

## 2024-04-24 RX ORDER — ACETAMINOPHEN 325 MG/1
650 TABLET ORAL EVERY 6 HOURS PRN
Start: 2024-04-24

## 2024-04-24 RX ADMIN — PANTOPRAZOLE SODIUM 40 MG: 40 INJECTION, POWDER, FOR SOLUTION INTRAVENOUS at 08:04

## 2024-04-24 RX ADMIN — LORAZEPAM 0.5 MG: 2 INJECTION INTRAMUSCULAR; INTRAVENOUS at 09:00

## 2024-04-24 RX ADMIN — HYDROMORPHONE HYDROCHLORIDE 0.5 MG: 1 INJECTION, SOLUTION INTRAMUSCULAR; INTRAVENOUS; SUBCUTANEOUS at 05:05

## 2024-04-24 RX ADMIN — SODIUM CHLORIDE, SODIUM LACTATE, POTASSIUM CHLORIDE, AND CALCIUM CHLORIDE 125 ML/HR: .6; .31; .03; .02 INJECTION, SOLUTION INTRAVENOUS at 05:26

## 2024-04-24 RX ADMIN — POTASSIUM PHOSPHATE, MONOBASIC POTASSIUM PHOSPHATE, DIBASIC 30 MMOL: 224; 236 INJECTION, SOLUTION, CONCENTRATE INTRAVENOUS at 09:02

## 2024-04-24 RX ADMIN — Medication 1 SPRAY: at 08:03

## 2024-04-24 NOTE — PLAN OF CARE
Problem: PAIN - ADULT  Goal: Verbalizes/displays adequate comfort level or baseline comfort level  Description: Interventions:  - Encourage patient to monitor pain and request assistance  - Assess pain using appropriate pain scale  - Administer analgesics based on type and severity of pain and evaluate response  - Implement non-pharmacological measures as appropriate and evaluate response  - Consider cultural and social influences on pain and pain management  - Notify physician/advanced practitioner if interventions unsuccessful or patient reports new pain  Outcome: Progressing     Problem: INFECTION - ADULT  Goal: Absence or prevention of progression during hospitalization  Description: INTERVENTIONS:  - Assess and monitor for signs and symptoms of infection  - Monitor lab/diagnostic results  - Monitor all insertion sites, i.e. indwelling lines, tubes, and drains  - Monitor endotracheal if appropriate and nasal secretions for changes in amount and color  - [place] appropriate cooling/warming therapies per order  - Administer medications as ordered  - Instruct and encourage patient and family to use good hand hygiene technique  - Identify and instruct in appropriate isolation precautions for identified infection/condition  Outcome: Progressing  Goal: Absence of fever/infection during neutropenic period  Description: INTERVENTIONS:  - Monitor WBC    Outcome: Progressing     Problem: SAFETY ADULT  Goal: Patient will remain free of falls  Description: INTERVENTIONS:  - Educate patient/family on patient safety including physical limitations  - Instruct patient to call for assistance with activity   - Consult OT/PT to assist with strengthening/mobility   - Keep Call bell within reach  - Keep bed low and locked with side rails adjusted as appropriate  - Keep care items and personal belongings within reach  - Initiate and maintain comfort rounds  - Make Fall Risk Sign visible to staff  - Offer Toileting every  Hours,  in advance of need  - Initiate/Maintain alarm  - Obtain necessary fall risk management equipment:   - Apply yellow socks and bracelet for high fall risk patients  - Consider moving patient to room near nurses station  Outcome: Progressing  Goal: Maintain or return to baseline ADL function  Description: INTERVENTIONS:  -  Assess patient's ability to carry out ADLs; assess patient's baseline for ADL function and identify physical deficits which impact ability to perform ADLs (bathing, care of mouth/teeth, toileting, grooming, dressing, etc.)  - Assess/evaluate cause of self-care deficits   - Assess range of motion  - Assess patient's mobility; develop plan if impaired  - Assess patient's need for assistive devices and provide as appropriate  - Encourage maximum independence but intervene and supervise when necessary  - Involve family in performance of ADLs  - Assess for home care needs following discharge   - Consider OT consult to assist with ADL evaluation and planning for discharge  - Provide patient education as appropriate  Outcome: Progressing  Goal: Maintains/Returns to pre admission functional level  Description: INTERVENTIONS:  - Perform AM-PAC 6 Click Basic Mobility/ Daily Activity assessment daily.  - Set and communicate daily mobility goal to care team and patient/family/caregiver.   - Collaborate with rehabilitation services on mobility goals if consulted  - Perform Range of Motion  times a day.  - Reposition patient every 2 hours.  - Dangle patient 3 times a day  - Stand patient 3 times a day  - Ambulate patient 3 times a day  - Out of bed to chair 3 times a day   - Out of bed for meals 3 times a day  - Out of bed for toileting  - Record patient progress and toleration of activity level   Outcome: Progressing     Problem: DISCHARGE PLANNING  Goal: Discharge to home or other facility with appropriate resources  Description: INTERVENTIONS:  - Identify barriers to discharge w/patient and caregiver  -  Arrange for needed discharge resources and transportation as appropriate  - Identify discharge learning needs (meds, wound care, etc.)  - Arrange for interpretive services to assist at discharge as needed  - Refer to Case Management Department for coordinating discharge planning if the patient needs post-hospital services based on physician/advanced practitioner order or complex needs related to functional status, cognitive ability, or social support system  Outcome: Progressing     Problem: Knowledge Deficit  Goal: Patient/family/caregiver demonstrates understanding of disease process, treatment plan, medications, and discharge instructions  Description: Complete learning assessment and assess knowledge base.  Interventions:  - Provide teaching at level of understanding  - Provide teaching via preferred learning methods  Outcome: Progressing

## 2024-04-24 NOTE — DISCHARGE INSTR - AVS FIRST PAGE
[facility]’s General Surgery [facility][1]     Post-Operative Care Instructions     Dr. Jam Perez MD, [facility]     [phone]          1. General: You will feel pulling sensations around the wound or funny aches and pains around the incisions. This is normal. Even minor surgery is a change in your body and this is your body’s way of reacting to it. If you have had abdominal surgery, it may help to support the incision with a small pillow or blanket for comfort when moving or coughing.     2. Wound care:  Okay to shower.  The glue will fall off over the next week or 2.   Use ice for at least the 1st 48 hours.  Do not use for longer than 20 minutes at a time. Use 3 times per day.     3. Water: You may shower over the wounds. Do not bathe or use a pool or hot tub until cleared by the physician.   If you were discharged with a drain, make sure drain site is covered with plastic wrap before showering.      4. Activity: You may go up and down stairs, walk as much as you are comfortable, but walk at least 3 times each day. If you have had abdominal surgery, do not lift anything heavier than 20 pounds for at least 4 weeks.      5. Diet: You may resume soft diet. If you had a same-day surgery or overnight stay surgery, you may wish to eat lightly for a few days: soups, crackers, and sandwiches. You may resume a regular diet when ready.      6. Medications: Resume all of your previous medications, unless told otherwise by the doctor. Avoid aspirin products for 2-3 days after the date of surgery. You may, at that time, began to take them again. Use Tylenol and Ibuprofen for pain control.  You may alternate these medications every 3 hours.  For example: you may take Tylenol at noon, Ibuprofen at 3:00 p.m., and Tylenol again at 6:00 p.m., etc. You should use ice to assist with pain control as above.  You do not need to take narcotic pain medication unless you are having significant pain.   If you were prescribed a narcotic  pain medication containing Tylenol, such as Percocet or Norco, do not use supplemental Tylenol.      7. Driving: You will need someone to drive you home on the day of surgery or discharge. Do not drive or make any important decisions while on narcotic pain medication or 24 hours and after anesthesia or sedation for surgery. Generally, you may drive when your off all narcotic pain medications and you are comfortable.      8. Upset Stomach: You may take Maalox, Tums, or similar items for an upset stomach. If your narcotic pain medication causes an upset stomach, do not take it on an empty stomach. Try taking it with at least some crackers or toast.      9. Constipation: Patients often experience constipation after surgery. You may take over-the-counter medication for this, such as Metamucil, Senokot, Dulcolax, milk of magnesia, etc. You may take a suppository unless you have had anorectal surgery such as a procedure on your hemorrhoids. If you experience significant nausea or vomiting after abdominal surgery, call the office before trying any of these medications.     10. Call the office: If you are experiencing any of the following: fevers above 101.5°, significant nausea or vomiting, if the wound develops drainage and/or there is excessive redness around the wound, or if you have significant diarrhea or other worsening symptoms.     11. Pain: You may be given a prescription for pain medication.  This will be sent to your pharmacy prior to discharge.     12. Sexual Activity: You may resume sexual activity when you feel ready and comfortable and your incision is sealed and healed without apparent infection risk.     13. Urination: If you have not urinated in 6 hours, go directly to the ER for evaluation for urinary retention.      14. Follow-up in 2 weeks.

## 2024-04-24 NOTE — UTILIZATION REVIEW
Initial Clinical Review    Admission: Date/Time/Statement:   Admission Orders (From admission, onward)       Ordered        04/23/24 1827  INPATIENT ADMISSION  Once                          Orders Placed This Encounter   Procedures    INPATIENT ADMISSION     Standing Status:   Standing     Number of Occurrences:   1     Order Specific Question:   Level of Care     Answer:   Med Surg [16]     Order Specific Question:   Estimated length of stay     Answer:   More than 2 Midnights     Order Specific Question:   Certification     Answer:   I certify that inpatient services are medically necessary for this patient for a duration of greater than two midnights. See H&P and MD Progress Notes for additional information about the patient's course of treatment.     ED Arrival Information       Expected   -    Arrival   4/23/2024 13:10    Acuity   Urgent              Means of arrival   Walk-In    Escorted by   Spouse    Service   Surgery-General    Admission type   Emergency              Arrival complaint   abdominal pain             Chief Complaint   Patient presents with    Abdominal Pain     Patient presents to the ED with c/o vomiting and diarrhea with abdominal pain, states went to urgent care and had an xray and was sent for possible bowel blockage.        Initial Presentation: 43 y.o. male  to ED, per Urgent Care,  via walk in from home.    Admitted to inpatient with Dx: Abdominal pain nausea and vomiting with concern of possible internal hernia or volvulus in a virgin abdomen.    Presented to ED with abdominal pain, nausea, vomiting and diarrhea starting the day prior to arrival.  Initially felt bloating and gassiness, then started with about 6 to 8 episodes of vomiting.  Loose stools and overnight into today with ongoing abdominal pain.  No recent alcohol intake  . PMHx: alcohol abuse, occasional methamphetamine and cocaine use, and anxiety. On exam: appears in discomfort and distress.   Generalized abdominal  tenderness and tenderness in periumbilical area. Lipase 219.  Wbc 15.39.     Imaging shows SBO.  Mesenteric edema with free fluid on ct abdomen. . ED treatment:  1 liter IVF, Zofran, Toradol, ngt placed.    Plan includes:  continued IVF,  start IV antibiotics.   To OR tonight.  Analgesia and antiemetics as needed.     Procedure 4/23/24 LAPAROSCOPY DIAGNOSTIC   Findings Clinically dilated stomach and proximal small bowel all the way down two thirds the length of the right lower quadrant.  Small bowel was dilated and edematous.  There was a discrete transition in caliber with the distal small bowel being decompressed and not erythematous.  Running the small bowel from the terminal ileum approximately the small bowel straightened out and began to fill.  Fair amount of pelvic ascites     Date: 4/24/24    Day 2:  today with incisional pain but has more discomfort from ngt.  + flatus.    On exam:  bdomen:   Soft, mildly distended, appropriate tenderness over incision sites, hypoactive bowel sounds.  Ngt 250 cc.   Wbc 10.71.   Dx:  Enteritis vs SBO.  POD#1 s/p diagnostic laparoscopy with dilated and edematous SB without findings of obstruction and suspect findings related to Enteritis.   Plan:  check KUB, if ok, dc ngt.  Analgesia and antiemetics as needed.  IVF.   Monitor bowel function.   Monitor and replete electrolytes.     ED Triage Vitals   Temperature Pulse Respirations Blood Pressure SpO2   04/23/24 1318 04/23/24 1318 04/23/24 1318 04/23/24 1318 04/23/24 1318   97.5 °F (36.4 °C) 57 18 137/87 100 %      Temp Source Heart Rate Source Patient Position - Orthostatic VS BP Location FiO2 (%)   04/23/24 1318 04/23/24 1318 04/23/24 1318 04/23/24 1318 --   Oral Monitor Lying Right arm       Pain Score       04/23/24 1315       5          Wt Readings from Last 1 Encounters:   04/23/24 95.3 kg (210 lb 1.6 oz)     Additional Vital Signs:   4/24/24 09:49:48 97.9 °F (36.6 °C) 66 -- 122/73 89 100 % -- -- -- --   04/24/24 0135  98.2 °F (36.8 °C) 75 18 134/82 -- -- -- -- -- Lying   04/23/24 2135 -- 66 12 140/75 101 -- -- -- -- --   04/23/24 2128 -- 64 15 133/75 96 96 % -- None (Room air) -- --   04/23/24 2118 -- 68 22 146/72 102 100 % 0 L/min None (Room air) -- --   04/23/24 2108 97.9 °F (36.6 °C) 66 12 134/69 95 100 % 6 L/min Simple mask WDL --   04/23/24 1926 -- 62 12 146/85 -- 99 % -- None (Room air) -- --   04/23/24 1800 -- 57 -- 137/80 103 98 % -- -- -- --   04/23/24 1600 -- 50 Abnormal  -- 138/78 103 99 % -- -- -- --   04/23/24 1510 -- 73 13 135/75 96 99 % -- -- -- --   04/23/24 1415 -- 54 Abnormal  15 136/87 107 100 % -- -- -- --   04/23/24 1330 -- 57 -- 142/92 110 100 % -- -- --      Pertinent Labs/Diagnostic Test Results:   CT abdomen pelvis with contrast   Final Result by Nereida Minor MD (04/23 1547)      1. Findings suggestive of small bowel obstruction with transition point probably in the right lower quadrant.      2. Mild mesenteric edema with trace interloop free fluid.      3. Incidentally noted indeterminate 10 mm right renal lesion, for which further characterization with nonemergent renal ultrasound is recommended.      The study was marked in EPIC for immediate notification.         Resident: SERAFIN Snow I, the attending radiologist, have reviewed the images and agree with the final report above.      Workstation performed: [id number]         XR abdomen 1 view kub    (Results Pending)     Results from last 7 days   Lab Units 04/24/24  0524 04/24/24  0511 04/23/24  1357   WBC Thousand/uL  --  10.71* 15.39*   HEMOGLOBIN g/dL  --  14.9 15.6   HEMATOCRIT %  --  45.5 47.4   PLATELETS Thousands/uL 284 284 285   TOTAL NEUT ABS Thousands/µL  --  9.60* 12.09*     Results from last 7 days   Lab Units 04/24/24  0511 04/23/24  1357   SODIUM mmol/L 137 135   POTASSIUM mmol/L 4.3 4.4   CHLORIDE mmol/L 107 103   CO2 mmol/L 25 26   ANION GAP mmol/L 5 6   BUN mg/dL 8 10   CREATININE mg/dL 0.82 0.80   EGFR ml/min/1.73sq m  107 108   CALCIUM mg/dL 8.6 9.0   MAGNESIUM mg/dL 2.0  --    PHOSPHORUS mg/dL 2.4*  --      Results from last 7 days   Lab Units 04/23/24  1357   AST U/L 15   ALT U/L 15   ALK PHOS U/L 75   TOTAL PROTEIN g/dL 6.5   ALBUMIN g/dL 4.2   TOTAL BILIRUBIN mg/dL 0.84     Results from last 7 days   Lab Units 04/24/24  0511 04/23/24  1357   GLUCOSE RANDOM mg/dL 157* 132     Results from last 7 days   Lab Units 04/23/24  1357   LIPASE u/L 219*     ED Treatment:   Medication Administration from 04/23/2024 1310 to 04/23/2024 1948         Date/Time Order Dose Route Action Comments     04/23/2024 1359 EDT sodium chloride 0.9 % bolus 1,000 mL 1,000 mL Intravenous New Bag --     04/23/2024 1358 EDT ondansetron (ZOFRAN) injection 4 mg 4 mg Intravenous Given --     04/23/2024 1358 EDT ketorolac (TORADOL) injection 15 mg 15 mg Intravenous Given --     04/23/2024 1807 EDT midazolam (VERSED) injection 1 mg 1 mg Intravenous Given --     04/23/2024 1807 EDT lidocaine (URO-JET) 2 % urethral/mucosal gel 1 Application 1 Application Urethral Given nasal     04/23/2024 1807 EDT benzocaine (HURRICAINE) 20 % mucosal spray 2 spray 2 spray Mucosal Given --     04/23/2024 1848 EDT ceFAZolin (ANCEF) IVPB (premix in dextrose) 2,000 mg 50 mL 2,000 mg Intravenous New Bag --     04/23/2024 1943 EDT lactated ringers infusion -- Intravenous New Bag --          Past Medical History:   Diagnosis Date    Alcohol abuse 4/23/2024    Anxiety 4/23/2024    Drug abuse ([facility]) 4/23/2024    pt reports abusing alcohol and occassionally meth and cocaine     Present on Admission:  **None**      Admitting Diagnosis: SBO (small bowel obstruction) ([facility]) [K56.609]  Abdominal pain [R10.9]  Age/Sex: 43 y.o. male  Admission Orders: 4/23/24 1827 inpatient   Scheduled Medications:  heparin (porcine), 5,000 Units, Subcutaneous, Q8H DELON  melatonin, 3 mg, Per NG Tube, HS  pantoprazole, 40 mg, Intravenous, Q12H DELON  potassium phosphate, 30 mmol, Intravenous, Once 0902 on 4/24/24      LORazepam (ATIVAN) injection 0.5 mg  Dose: 0.5 mg  Freq: Once Route: IV  Indications of Use: AGITATION  Indications Comment: anxiety  Start: 04/24/24 0845 End: 04/24/24 0900   metroNIDAZOLE (FLAGYL) IVPB (premix) 500 mg 100 mL  Dose: 500 mg  Freq: Once Route: IV  Last Dose: Stopped (04/23/24 2031)  Start: 04/23/24 1830 End: 04/23/24 2031    bupivacaine-epinephrine (PF) (MARCAINE/EPINEPHRINE PF) 0.25 %-[id number] injection  Start: 04/23/24 2007 End: 04/23/24 2107     Continuous IV Infusions:  lactated ringers, 125 mL/hr, Intravenous, Continuous    PRN Meds:  HYDROmorphone, 0.5 mg, Intravenous, Q6H PRN x 1 4/24/24   lactated ringers, 1,000 mL, Intravenous, Once PRN   And  lactated ringers, 1,000 mL, Intravenous, Once PRN  phenol, 1 spray, Mouth/Throat, Q2H PRN x 1 4/24   sodium chloride, 1,000 mL, Intravenous, Once PRN   And  sodium chloride, 1,000 mL, Intravenous, Once PRN    Ngt to low intermittent wall suction        Network Utilization Review Department  ATTENTION: Please call with any questions or concerns to [phone] and carefully listen to the prompts so that you are directed to the right person. All voicemails are confidential.   For Discharge needs, contact Care Management DC Support Team at [phone] opt. 2  Send all requests for admission clinical reviews, approved or denied determinations and any other requests to dedicated fax number below belonging to the [facility] where the patient is receiving treatment. List of dedicated fax numbers for the Facilities:  FACILITY NAME UR FAX NUMBER   ADMISSION DENIALS (Administrative/Medical Necessity) [phone]   DISCHARGE SUPPORT TEAM (NETWORK) [phone]   PARENT CHILD HEALTH (Maternity/NICU/Pediatrics) [phone]   [facility] [phone]   [facility] [phone]   [facility] [phone]   [facility] [phone]   [facility]  [place] [phone]   [facility] [phone]   [facility] [phone]   [facility] [phone]   [facility] [phone]   [facility] [phone]   [facility] [phone]   [facility] [phone]

## 2024-04-24 NOTE — UTILIZATION REVIEW
NOTIFICATION OF INPATIENT ADMISSION   AUTHORIZATION REQUEST   SERVICING FACILITY:   [place] [address]  Tax ID: [id number]  NPI: [id number] ATTENDING PROVIDER:  Attending Name and NPI#: Jam Perez Md [[id number]]  Address: [address] [place] [address]  Phone: [phone]   RN is working on review will send once completed   ADMISSION INFORMATION:  Place of Service: Inpatient [facility]  Place of Service Code: 21  Inpatient Admission Date/Time: 4/23/24  6:27 PM  Discharge Date/Time: No discharge date for patient encounter.  Admitting Diagnosis Code/Description:  SBO (small bowel obstruction) (H[facility]) [K56.609]  Abdominal pain [R10.9]     UTILIZATION REVIEW CONTACT:  Lyly Yousif, Utilization   Network Utilization Review Department  Phone: [phone]  Fax: [phone]  Email: [email or web address]  Contact for approvals/pending authorizations, clinical reviews, and discharge.     PHYSICIAN ADVISORY SERVICES:  Medical Necessity Denial & Gdpe-gf-Vkac Review  Phone: [phone]  Fax: [phone]  Email: [email or web address]     DISCHARGE SUPPORT TEAM:  For Patients Discharge Needs & Updates  Phone: [phone] opt. 2 Fax: [phone]  Email: [email or web address]

## 2024-04-24 NOTE — ANESTHESIA POSTPROCEDURE EVALUATION
Post-Op Assessment Note    CV Status:  Stable    Pain management: adequate       Mental Status:  Alert and awake   Hydration Status:  Euvolemic   PONV Controlled:  Controlled   Airway Patency:  Patent     Post Op Vitals Reviewed: Yes    No anethesia notable event occurred.    Staff: Anesthesiologist, with CRNAs         [id number] 98% RA

## 2024-04-24 NOTE — PLAN OF CARE
Problem: PAIN - ADULT  Goal: Verbalizes/displays adequate comfort level or baseline comfort level  Description: Interventions:  - Encourage patient to monitor pain and request assistance  - Assess pain using appropriate pain scale  - Administer analgesics based on type and severity of pain and evaluate response  - Implement non-pharmacological measures as appropriate and evaluate response  - Consider cultural and social influences on pain and pain management  - Notify physician/advanced practitioner if interventions unsuccessful or patient reports new pain  Outcome: Progressing     Problem: INFECTION - ADULT  Goal: Absence or prevention of progression during hospitalization  Description: INTERVENTIONS:  - Assess and monitor for signs and symptoms of infection  - Monitor lab/diagnostic results  - Monitor all insertion sites, i.e. indwelling lines, tubes, and drains  - Monitor endotracheal if appropriate and nasal secretions for changes in amount and color  - Blunt appropriate cooling/warming therapies per order  - Administer medications as ordered  - Instruct and encourage patient and family to use good hand hygiene technique  - Identify and instruct in appropriate isolation precautions for identified infection/condition  Outcome: Progressing  Goal: Absence of fever/infection during neutropenic period  Description: INTERVENTIONS:  - Monitor WBC    Outcome: Progressing     Problem: SAFETY ADULT  Goal: Patient will remain free of falls  Description: INTERVENTIONS:  - Educate patient/family on patient safety including physical limitations  - Instruct patient to call for assistance with activity   - Consult OT/PT to assist with strengthening/mobility   - Keep Call bell within reach  - Keep bed low and locked with side rails adjusted as appropriate  - Keep care items and personal belongings within reach  - Initiate and maintain comfort rounds  - Make Fall Risk Sign visible to staff  - Offer Toileting every 2 Hours,  in advance of need  - Initiate/Maintain alarm  - Obtain necessary fall risk management equipment:   - Apply yellow socks and bracelet for high fall risk patients  - Consider moving patient to room near nurses station  Outcome: Progressing  Goal: Maintain or return to baseline ADL function  Description: INTERVENTIONS:  -  Assess patient's ability to carry out ADLs; assess patient's baseline for ADL function and identify physical deficits which impact ability to perform ADLs (bathing, care of mouth/teeth, toileting, grooming, dressing, etc.)  - Assess/evaluate cause of self-care deficits   - Assess range of motion  - Assess patient's mobility; develop plan if impaired  - Assess patient's need for assistive devices and provide as appropriate  - Encourage maximum independence but intervene and supervise when necessary  - Involve family in performance of ADLs  - Assess for home care needs following discharge   - Consider OT consult to assist with ADL evaluation and planning for discharge  - Provide patient education as appropriate  Outcome: Progressing  Goal: Maintains/Returns to pre admission functional level  Description: INTERVENTIONS:  - Perform AM-PAC 6 Click Basic Mobility/ Daily Activity assessment daily.  - Set and communicate daily mobility goal to care team and patient/family/caregiver.   - Collaborate with rehabilitation services on mobility goals if consulted  - Perform Range of Motion 2 times a day.  - Reposition patient every 2 hours.  - Dangle patient 2 times a day  - Stand patient 2 times a day  - Ambulate patient 2 times a day  - Out of bed to chair 2 times a day   - Out of bed for meals 2 times a day  - Out of bed for toileting  - Record patient progress and toleration of activity level   Outcome: Progressing     Problem: DISCHARGE PLANNING  Goal: Discharge to home or other facility with appropriate resources  Description: INTERVENTIONS:  - Identify barriers to discharge w/patient and caregiver  -  Arrange for needed discharge resources and transportation as appropriate  - Identify discharge learning needs (meds, wound care, etc.)  - Arrange for interpretive services to assist at discharge as needed  - Refer to Case Management Department for coordinating discharge planning if the patient needs post-hospital services based on physician/advanced practitioner order or complex needs related to functional status, cognitive ability, or social support system  Outcome: Progressing     Problem: Knowledge Deficit  Goal: Patient/family/caregiver demonstrates understanding of disease process, treatment plan, medications, and discharge instructions  Description: Complete learning assessment and assess knowledge base.  Interventions:  - Provide teaching at level of understanding  - Provide teaching via preferred learning methods  Outcome: Progressing

## 2024-04-24 NOTE — OP NOTE
OPERATIVE REPORT  PATIENT NAME: Cj De La Cruz    :  1980  MRN: [id number]  Pt Location: [facility] OR ROOM 01    SURGERY DATE: 2024    Surgeons and Role:     * Jam Perez MD - Primary     * Whit Tomas PA-C - Assisting     * Elder Maxwell - Assisting    Preop Diagnosis:  SBO (small bowel obstruction) ([facility]) [K56.609]    Post-Op Diagnosis Codes:     * SBO (small bowel obstruction) ([facility]) [K56.609]    Procedure(s):  LAPAROSCOPY DIAGNOSTIC    Specimen(s):  * No specimens in log *    Estimated Blood Loss:   Minimal    Drains:  NG/OG/Enteral Tube 18 Fr Right nare (Active)   Number of days: 0       Anesthesia Type:   General    Operative Indications:  SBO (small bowel obstruction) ([facility]) [K56.609]      Operative Findings:  Cynically dilated stomach and proximal small bowel all the way down two thirds the length of the right lower quadrant.  Small bowel was dilated and edematous.  There was a discrete transition in caliber with the distal small bowel being decompressed and not erythematous.  Running the small bowel from the terminal ileum approximately the small bowel straightened out and began to fill.  Fair amount of pelvic ascites    Complications:   None    Procedure and Technique:  The patient was seen again in the Holding Room. The risks, benefits, complications, treatment options, and expected outcomes were discussed with the patient.  The patient and/or family concurred with the proposed plan, giving informed consent. The site of surgery properly noted/marked. The patient was taken to Operating Room, identified as Cj De La Cruz  and the procedure verified. A Time Out was held after prepping and draping in sterile fashion.  The above information was confirmed.    Prior to the induction of general anesthesia, antibiotic prophylaxis was administered. General endotracheal anesthesia was then administered and tolerated well. After the induction, the abdomen was prepped in the usual sterile fashion.  A  supraumbilical incision was made with a 11 blade scalpel after infusion of local anesthetic.  Kocher's were used to dissect down to the fascia and the fascia was grasped and cut with an 11 blade.  5 trocars placed in under direct visitation.  2 other trocars were placed on the right side.  On initial inspection there were significantly dilated loops proximally.  The cecum was identified and the terminal ileum was identified.  The small bowel was run hand overhand from terminal ileum towards ligament of Treitz.  The distal third of the small bowel was normal and decompressed.  In the right lower quadrant right side there was a definite caliber change as the small bowel loops were straightened out between approximately dilated and erythematous small bowel in the distal decompressed small bowel.  Although there is no adhesive band there is a definite transition which might have been due to the massive dilation of proximal bowel causing some form of compression or volvulus.  The small bowel was then run again from terminal and back to ligament of Treitz and again there is no he is advanced any other abnormalities noted.  The free fluid was suctioned out of the pelvis.  NG was placed in appropriate position.  The supraumbilical trocar fascia was closed with figure-of-eight 0 Vicryl suture.  The abdomen is deflated.  Skin was closed with 4-0 Monocryl and glue.   I was present for the entire procedure.    Patient Disposition:  PACU           SIGNATURE: Jam Perez MD  DATE: April 23, 2024  TIME: 8:55 PM

## 2024-04-24 NOTE — PROGRESS NOTES
"Progress Note - General Surgery   Cj De La Cruz 43 y.o. male MRN: [id number]  Unit/Bed#: -01 Encounter: [id number]    Assessment:  Enteritis versus SBO  -No prior history of abdominal surgery  -POD#1 s/p diagnostic laparoscopy with dilated and edematous SB without findings of obstruction  -Findings likely related to enteritis  -Leukocytosis likely reactive, continue to trend  -NG tube with 250 mL output overnight, check KUB, consider removal  Pain control as needed  Encourage out of bed, ambulation, incentive spirometer  Follow bowel function  Monitor and replace electrolytes      Subjective/Objective   Chief Complaint: pain    Subjective: Planes of incisional pain but more pain from NG tube.  Not out of bed.  States he has been passing flatus.  No fevers or chills    Objective:     Vitals: Blood pressure 134/82, pulse 75, temperature 98.2 °F (36.8 °C), temperature source Temporal, resp. rate 18, height 6' 1\" (1.854 m), weight 95.3 kg (210 lb 1.6 oz), SpO2 96%.,Body mass index is 27.72 kg/m².  I/O last 24 hours:  In: 1850 [I.V.:1700; IV Piggyback:150]  Out: 1230 [Urine:980; Emesis/NG output:250]  Invasive Devices       Peripheral Intravenous Line  Duration             Peripheral IV 04/23/24 Right Antecubital <1 day    Peripheral IV 04/23/24 Right Arm <1 day              Drain  Duration             NG/OG/Enteral Tube 18 Fr Right nare <1 day                    Physical Exam:   General appearance: alert and oriented, in no acute distress  Head: Normocephalic, without obvious abnormality, atraumatic, sclerae anicteric, mucous membranes moist  Neck: no JVD and supple, symmetrical, trachea midline  Lungs: clear to auscultation, no wheezes or rales  Heart:   Regular rate and rhythm, S1-S2 normal, no murmur  Abdomen:   Soft, mildly distended, appropriate tenderness over incision sites, hypoactive bowel sounds  Extremities:   No edema, redness or tenderness in the calves or thighs  Skin: Warm, dry; incision sites " clean, dry intact  Nursing notes and vital signs reviewed      Lab, Imaging and other studies: I have personally reviewed pertinent reports.  , CBC with diff:   Lab Results   Component Value Date    WBC 10.71 (H) 04/24/2024    HGB 14.9 04/24/2024    HCT 45.5 04/24/2024    MCV 90 04/24/2024     04/24/2024    RBC 5.04 04/24/2024    MCH 29.6 04/24/2024    MCHC 32.7 04/24/2024    RDW 12.7 04/24/2024    MPV 10.0 04/24/2024    NRBC 0 04/24/2024   , BMP/CMP:   Lab Results   Component Value Date    SODIUM 137 04/24/2024    K 4.3 04/24/2024     04/24/2024    CO2 25 04/24/2024    BUN 8 04/24/2024    CREATININE 0.82 04/24/2024    CALCIUM 8.6 04/24/2024    AST 15 04/23/2024    ALT 15 04/23/2024    ALKPHOS 75 04/23/2024    EGFR 107 04/24/2024   Magnesium: 2.0    VTE Pharmacologic Prophylaxis: Heparin  VTE Mechanical Prophylaxis: sequential compression device    Brenda Morrow

## 2024-04-24 NOTE — ANESTHESIA POSTPROCEDURE EVALUATION
Post-Op Assessment Note    CV Status:  Stable    Pain management: adequate       Mental Status:  Awake and sleepy   Hydration Status:  Euvolemic   PONV Controlled:  Controlled   Airway Patency:  Patent     Post Op Vitals Reviewed: Yes    No anethesia notable event occurred.    Staff: Anesthesiologist, with CRNAs         BP   134/69   Temp   97.9   Pulse  68   Resp   14   SpO2   100 % 6L FM

## 2024-04-25 NOTE — UTILIZATION REVIEW
NOTIFICATION OF ADMISSION DISCHARGE   This is a Notification of Discharge from [facility]. Please be advised that this patient has been discharge from our facility. Below you will find the admission and discharge date and time including the patient’s disposition.   UTILIZATION REVIEW CONTACT:  Kristal Garza  Utilization   Network Utilization Review Department  Phone: [phone] x carefully listen to the prompts. All voicemails are confidential.  Email: [email or web address]     ADMISSION INFORMATION  PRESENTATION DATE: 4/23/2024  1:13 PM  OBERVATION ADMISSION DATE:   INPATIENT ADMISSION DATE: 4/23/24  6:27 PM   DISCHARGE DATE: 4/24/2024  3:43 PM   DISPOSITION:Home/Self Care    Network Utilization Review Department  ATTENTION: Please call with any questions or concerns to [phone] and carefully listen to the prompts so that you are directed to the right person. All voicemails are confidential.   For Discharge needs, contact Care Management DC Support Team at [phone] opt. 2  Send all requests for admission clinical reviews, approved or denied determinations and any other requests to dedicated fax number below belonging to the campus where the patient is receiving treatment. List of dedicated fax numbers for the Facilities:  FACILITY NAME UR FAX NUMBER   ADMISSION DENIALS (Administrative/Medical Necessity) [phone]   DISCHARGE SUPPORT TEAM ([facility]) [phone]   PARENT CHILD HEALTH (Maternity/NICU/Pediatrics) [phone]   [facility] [phone]   [facility] [phone]   [facility] [phone]   General acute hospital [phone]   [facility] [phone]   [facility] [phone]   [facility] [phone]   [facility] [phone]   [facility]  [facility] [phone]   [facility] [phone]   [facility] [phone]   [facility] [phone]

## 2024-04-25 NOTE — DISCHARGE SUMMARY
Discharge Summary - Cj De La Cruz 43 y.o. male MRN: [id number]    Unit/Bed#: -01 Encounter: [id number]    Admission Date:   Admission Orders (From admission, onward)       Ordered        04/23/24 1827  INPATIENT ADMISSION  Once                            Admitting Diagnosis: SBO (small bowel obstruction) ([facility]) [K56.609]  Abdominal pain [R10.9]    HPI: 43-year-old male who presented to the emergency department with complaints of nausea vomiting abdominal pain.  Patient had CT scan performed which was suggestive of small bowel obstruction.  Patient had no prior history of surgical intervention.  Given the findings patient was consented for diagnostic laparoscopy.  Patient underwent diagnostic laparoscopy with evidence of inflamed small bowel but no findings of adhesions leading to obstruction.  There was decompressed distal small bowel with proximal distended bowel.    Procedures Performed: Diagnostic laparoscopy    Summary of Hospital Course: Patient underwent diagnostic laparoscopy with evidence of inflamed small bowel but no findings of adhesions leading to obstruction.  There was decompressed distal small bowel with proximal distended bowel.  Postoperatively patient did well and started on clear liquids without any nausea or vomiting.  Patient was able to have bowel movements and passed flatus prior to discharge.  Patient was advanced to a diet and was discharged home on postop day 1.    Significant Findings, Care, Treatment and Services Provided: Surgical services provided including diagnostic laparoscopy.    Complications: None    Discharge Diagnosis: Enteritis    Medical Problems       Resolved Problems  Date Reviewed: 4/24/2024   None         Condition at Discharge: good         Discharge instructions/Information to patient and family:   See after visit summary for information provided to patient and family.      Provisions for Follow-Up Care:  See after visit summary for information related to  follow-up care and any pertinent home health orders.      PCP: Tyrone Squires MD    Disposition: Home    Planned Readmission: No      Discharge Statement   I spent 20 minutes discharging the patient. This time was spent on the day of discharge. I had direct contact with the patient on the day of discharge. Additional documentation is required if more than 30 minutes were spent on discharge.     Discharge Medications:  See after visit summary for reconciled discharge medications provided to patient and family.

## 2024-10-07 ENCOUNTER — APPOINTMENT (EMERGENCY)
Dept: RADIOLOGY | Facility: HOSPITAL | Age: 44
End: 2024-10-07
Payer: COMMERCIAL

## 2024-10-07 ENCOUNTER — HOSPITAL ENCOUNTER (EMERGENCY)
Facility: HOSPITAL | Age: 44
Discharge: HOME | End: 2024-10-07
Attending: EMERGENCY MEDICINE | Admitting: EMERGENCY MEDICINE
Payer: COMMERCIAL

## 2024-10-07 VITALS
RESPIRATION RATE: 18 BRPM | TEMPERATURE: 98.6 F | OXYGEN SATURATION: 98 % | SYSTOLIC BLOOD PRESSURE: 176 MMHG | HEART RATE: 85 BPM | DIASTOLIC BLOOD PRESSURE: 94 MMHG

## 2024-10-07 DIAGNOSIS — S90.511A ABRASION OF RIGHT ANKLE, INITIAL ENCOUNTER: ICD-10-CM

## 2024-10-07 DIAGNOSIS — S90.01XA CONTUSION OF RIGHT ANKLE, INITIAL ENCOUNTER: Primary | ICD-10-CM

## 2024-10-07 PROCEDURE — 73610 X-RAY EXAM OF ANKLE: CPT

## 2024-10-07 PROCEDURE — 99283 EMERGENCY DEPT VISIT LOW MDM: CPT

## 2024-10-07 PROCEDURE — 99284 EMERGENCY DEPT VISIT MOD MDM: CPT | Performed by: EMERGENCY MEDICINE

## 2024-10-07 RX ORDER — IBUPROFEN 600 MG/1
600 TABLET, FILM COATED ORAL ONCE
Status: COMPLETED | OUTPATIENT
Start: 2024-10-07 | End: 2024-10-07

## 2024-10-07 RX ADMIN — IBUPROFEN 600 MG: 600 TABLET, FILM COATED ORAL at 02:23

## 2024-10-07 NOTE — DISCHARGE INSTRUCTIONS
You have been seen for evaluation of ankle pain and wound. Please apply local wound care. Take tylenol and motrin for discomfort. Return to the emergency department if you develop worsening pain, weakness/numbness or any other symptoms of concern. Please follow up with your PCP by calling the number provided.    Your blood pressure is elevated on your visit today. When left untreated, the damage that high blood pressure does to your circulatory system is a significant contributing factor to heart attack, stroke, chronic kidney disease and other health threats. Please arrange for a blood pressure recheck with a PCP within the next week for further evaluation.

## 2024-10-07 NOTE — ED PROVIDER NOTES
Final diagnoses:   Contusion of right ankle, initial encounter   Abrasion of right ankle, initial encounter     ED Disposition       ED Disposition   Discharge    Condition   Stable    Date/Time   Mon Oct 7, 2024  2:31 AM    Comment   Cj De La Cruz discharge to home/self care.                   Assessment & Plan       Medical Decision Making    44 y.o. male presenting for right ankle pain.  Abrasion noted, superficial and does not involve achilles tendon. Will apply dressing, do not feel wound requires primary closure especially given > 24 hours since injury.  Tetanus immunization UTD per patient.  Will obtain xray to evaluate for fracture/dislocation.  No evidence of achilles tendon injury on exam.  Ddx includes contusion.  Will treat symptomatically.    I have reviewed preliminary ED interpretation of x-rays with the patient. The patient understands that their x-rays will be interpreted independently by a radiologist at a later time. The patient is agreeable to discharge at this time and understands that they may be contacted after discharge from the emergency department pending formal xray interpretation by radiology.     Disposition: I have discussed with the patient our plan to discharge them from the ED and the patient is in agreement with this plan.     Discharge Plan: Encouraged PRN tylenol/motrin for discomfort. Provided with crutches/ankle brace. RTED precautions emphasized. The patient was provided a written after visit summary with strict RTED precautions.     Followup: I have discussed with the patient plan to follow up with their PCP. Contact information provided in AVS.    Amount and/or Complexity of Data Reviewed  Radiology: ordered and independent interpretation performed.    Risk  Prescription drug management.             Medications   ibuprofen (MOTRIN) tablet 600 mg (600 mg Oral Given 10/7/24 0223)       ED Risk Strat Scores                           SBIRT 20yo+      Flowsheet Row Most Recent  Value   Initial Alcohol Screen: US AUDIT-C     1. How often do you have a drink containing alcohol? 0 Filed at: 10/07/2024 0126   2. How many drinks containing alcohol do you have on a typical day you are drinking?  0 Filed at: 10/07/2024 0126   3a. Male UNDER 65: How often do you have five or more drinks on one occasion? 0 Filed at: 10/07/2024 0126   3b. FEMALE Any Age, or MALE 65+: How often do you have 4 or more drinks on one occassion? 0 Filed at: 10/07/2024 0126   Audit-C Score 0 Filed at: 10/07/2024 0126   HESHAM: How many times in the past year have you...    Used an illegal drug or used a prescription medication for non-medical reasons? Never Filed at: 10/07/2024 0126                            History of Present Illness       Chief Complaint   Patient presents with    Foot Pain     Pt reports foot pain on right foot pt reports counter top fell on the back of his ankle, pt is looking for an ankle brace or crutches, pt reports everything else is fine, pt went to  but they where not open       Past Medical History:   Diagnosis Date    Alcohol abuse 4/23/2024    Anxiety 4/23/2024    Drug abuse (HCC) 4/23/2024    pt reports abusing alcohol and occassionally meth and cocaine      Past Surgical History:   Procedure Laterality Date    MS LAPS ABD PRTM&OMENTUM DX W/WO SPEC BR/WA SPX N/A 4/23/2024    Procedure: LAPAROSCOPY DIAGNOSTIC;  Surgeon: Jam Perez MD;  Location: [facility];  Service: General      History reviewed. No pertinent family history.   Social History     Tobacco Use    Smoking status: Former   Substance Use Topics    Alcohol use: Yes     Alcohol/week: 12.0 - 18.0 standard drinks of alcohol     Types: 12 - 18 Cans of beer per week     Comment: says he was sober for 6 months, then 4-6 weeks ago began drinking 12-18 beers once or twice on weekend nights    Drug use: Yes     Types: Cocaine, Methamphetamines     Comment: pt reports occasional use of meth and cocaine      E-Cigarette/Vaping       E-Cigarette/Vaping Substances      I have reviewed and agree with the history as documented.     Cj De La Cruz is a 44 y.o. year old male presenting to the [facility] ED for right ankle pain. Patient had a wooden  block land on the back his right ankle two days ago. He has had worsening pain and swelling in the area. He had a small break in the skin in the area which he soaked in water. No weakness/numbness in the RLE. He has been able to bear weight on the RLE though with discomfort. He has FROM in the right ankle. Tetanus immunization UTD per patient.      History provided by:  Medical records and patient   used: No        Review of Systems   Constitutional:  Negative for chills and fever.   Respiratory:  Negative for shortness of breath.    Cardiovascular:  Negative for chest pain.   Musculoskeletal:  Positive for arthralgias and joint swelling.   Skin:  Positive for wound.   Neurological:  Negative for weakness and numbness.   All other systems reviewed and are negative.          Objective       ED Triage Vitals   Temperature Pulse Blood Pressure Respirations SpO2 Patient Position - Orthostatic VS   10/07/24 0125 10/07/24 0125 10/07/24 0125 10/07/24 0125 10/07/24 0125 --   98.6 °F (37 °C) 85 (!) 176/94 18 98 %       Temp Source Heart Rate Source BP Location FiO2 (%) Pain Score    10/07/24 0125 10/07/24 0125 -- -- 10/07/24 0223    Temporal Monitor   6      Vitals      Date and Time Temp Pulse SpO2 Resp BP Pain Score FACES Pain Rating User   10/07/24 0223 -- -- -- -- -- 6 -- KK   10/07/24 0125 98.6 °F (37 °C) 85 98 % 18 176/94 -- -- CK            Physical Exam  Vitals and nursing note reviewed.   Constitutional:       General: He is not in acute distress.     Appearance: Normal appearance. He is well-developed. He is not ill-appearing, toxic-appearing or diaphoretic.   HENT:      Head: Normocephalic and atraumatic.      Nose: No congestion or rhinorrhea.   Cardiovascular:      Rate and  Rhythm: Normal rate and regular rhythm.   Pulmonary:      Effort: Pulmonary effort is normal. No respiratory distress.      Breath sounds: Normal breath sounds. No wheezing or rales.   Musculoskeletal:      Right lower leg: No tenderness or bony tenderness.      Right ankle: Swelling present. No deformity. Tenderness present over the lateral malleolus. Normal range of motion.      Right Achilles Tendon: No tenderness or defects. Banegas's test negative.      Right foot: Normal range of motion. No swelling, tenderness or bony tenderness.   Skin:     General: Skin is warm.      Capillary Refill: Capillary refill takes less than 2 seconds.   Neurological:      Mental Status: He is alert and oriented to person, place, and time.      GCS: GCS eye subscore is 4. GCS verbal subscore is 5. GCS motor subscore is 6.      Comments: 5/5 strength RLE dorsi/plantarflexion.  Sensation grossly intact throughout.     Psychiatric:         Mood and Affect: Mood and affect and mood normal.         Behavior: Behavior normal.         Results Reviewed       None            XR ankle 3+ views RIGHT   ED Interpretation by Cj Canas DO (10/07 0229)   No acute fracture or dislocation.      Final Interpretation by Chao Bennett MD (10/07 0824)      No acute osseous abnormality.         Computerized Assisted Algorithm (CAA) may have been used to analyze all applicable images.               Workstation performed: [id number]             Procedures    ED Medication and Procedure Management   Prior to Admission Medications   Prescriptions Last Dose Informant Patient Reported? Taking?   Calcium Carbonate Antacid (SHEYLA-SELTZER ANTACID PO)   Yes No   Sig: Take 1 tablet by mouth daily.   acetaminophen (TYLENOL) 325 mg tablet   No No   Sig: Take 2 tablets (650 mg total) by mouth every 6 (six) hours as needed for mild pain or moderate pain      Facility-Administered Medications: None     Discharge Medication List as of 10/7/2024  2:32 AM         CONTINUE these medications which have NOT CHANGED    Details   acetaminophen (TYLENOL) 325 mg tablet Take 2 tablets (650 mg total) by mouth every 6 (six) hours as needed for mild pain or moderate pain, Starting Wed 4/24/2024, No Print      Calcium Carbonate Antacid (SHEYLA-SELTZER ANTACID PO) Take 1 tablet by mouth daily., Until Discontinued, Historical Med           No discharge procedures on file.  ED SEPSIS DOCUMENTATION   Time reflects when diagnosis was documented in both MDM as applicable and the Disposition within this note       Time User Action Codes Description Comment    10/7/2024  2:31 AM Cj Canas [S90.01XA] Contusion of right ankle, initial encounter     10/7/2024  2:31 AM Cj Canas [S90.511A] Abrasion of right ankle, initial encounter                  Cj Canas,   10/07/24 2103

## (undated) DEVICE — TROCAR: Brand: KII FIOS FIRST ENTRY

## (undated) DEVICE — SUT VICRYL 0 UR-6 27 IN J603H

## (undated) DEVICE — ALLENTOWN LAP CHOLE APP PACK: Brand: CARDINAL HEALTH

## (undated) DEVICE — TUBING SMOKE EVAC W/FILTRATION DEVICE PLUMEPORT ACTIV

## (undated) DEVICE — 3M™ STERI-STRIP™ REINFORCED ADHESIVE SKIN CLOSURES, R1547, 1/2 IN X 4 IN (12 MM X 100 MM), 6 STRIPS/ENVELOPE: Brand: 3M™ STERI-STRIP™

## (undated) DEVICE — LAP AEM SCISSOR TIP .75 IN

## (undated) DEVICE — SUT MONOCRYL 4-0 PS-2 27 IN Y426H

## (undated) DEVICE — INSUFFLATION TUBING PRIMFLO

## (undated) DEVICE — BLUE HEAT SCOPE WARMER

## (undated) DEVICE — ADHESIVE SKIN HIGH VISCOSITY EXOFIN 1ML

## (undated) DEVICE — CHLORAPREP HI-LITE 26ML ORANGE

## (undated) DEVICE — SCD SEQUENTIAL COMPRESSION COMFORT SLEEVE MEDIUM KNEE LENGTH: Brand: KENDALL SCD

## (undated) DEVICE — TROCAR: Brand: KII® SLEEVE

## (undated) DEVICE — INTENDED FOR TISSUE SEPARATION, AND OTHER PROCEDURES THAT REQUIRE A SHARP SURGICAL BLADE TO PUNCTURE OR CUT.: Brand: BARD-PARKER SAFETY BLADES SIZE 11, STERILE

## (undated) DEVICE — PAD GROUNDING DUAL ADULT

## (undated) DEVICE — GLOVE SRG BIOGEL ECLIPSE 7.5

## (undated) DEVICE — GLOVE INDICATOR PI UNDERGLOVE SZ 8 BLUE

## (undated) DEVICE — AEM CORD